# Patient Record
Sex: FEMALE | Race: WHITE | NOT HISPANIC OR LATINO
[De-identification: names, ages, dates, MRNs, and addresses within clinical notes are randomized per-mention and may not be internally consistent; named-entity substitution may affect disease eponyms.]

---

## 2022-08-22 PROBLEM — Z00.00 ENCOUNTER FOR PREVENTIVE HEALTH EXAMINATION: Status: ACTIVE | Noted: 2022-08-22

## 2022-08-23 ENCOUNTER — APPOINTMENT (OUTPATIENT)
Dept: OBGYN | Facility: CLINIC | Age: 22
End: 2022-08-23

## 2022-08-23 ENCOUNTER — NON-APPOINTMENT (OUTPATIENT)
Age: 22
End: 2022-08-23

## 2022-08-23 PROCEDURE — 99204 OFFICE O/P NEW MOD 45 MIN: CPT

## 2022-08-23 PROCEDURE — 76805 OB US >/= 14 WKS SNGL FETUS: CPT

## 2022-09-02 ENCOUNTER — NON-APPOINTMENT (OUTPATIENT)
Age: 22
End: 2022-09-02

## 2022-09-02 DIAGNOSIS — O35.8XX0 MATERNAL CARE FOR OTHER (SUSPECTED) FETAL ABNORMALITY AND DAMAGE, NOT APPLICABLE OR UNSPECIFIED: ICD-10-CM

## 2022-09-22 ENCOUNTER — APPOINTMENT (OUTPATIENT)
Dept: OBGYN | Facility: CLINIC | Age: 22
End: 2022-09-22

## 2022-09-22 VITALS
BODY MASS INDEX: 23.04 KG/M2 | DIASTOLIC BLOOD PRESSURE: 70 MMHG | SYSTOLIC BLOOD PRESSURE: 115 MMHG | HEIGHT: 63 IN | WEIGHT: 130 LBS

## 2022-09-22 PROCEDURE — 0502F SUBSEQUENT PRENATAL CARE: CPT

## 2022-10-06 DIAGNOSIS — Z34.90 ENCOUNTER FOR SUPERVISION OF NORMAL PREGNANCY, UNSPECIFIED, UNSPECIFIED TRIMESTER: ICD-10-CM

## 2022-10-13 LAB
BASOPHILS # BLD AUTO: 0.05 K/UL
BASOPHILS NFR BLD AUTO: 0.4 %
EOSINOPHIL # BLD AUTO: 0.14 K/UL
EOSINOPHIL NFR BLD AUTO: 1.1 %
GLUCOSE 1H P 50 G GLC PO SERPL-MCNC: 119 MG/DL
HCT VFR BLD CALC: 32 %
HGB BLD-MCNC: 10.5 G/DL
IMM GRANULOCYTES NFR BLD AUTO: 0.8 %
LYMPHOCYTES # BLD AUTO: 1.59 K/UL
LYMPHOCYTES NFR BLD AUTO: 12.1 %
MAN DIFF?: NORMAL
MCHC RBC-ENTMCNC: 31.4 PG
MCHC RBC-ENTMCNC: 32.8 G/DL
MCV RBC AUTO: 95.8 FL
MONOCYTES # BLD AUTO: 1.03 K/UL
MONOCYTES NFR BLD AUTO: 7.8 %
NEUTROPHILS # BLD AUTO: 10.28 K/UL
NEUTROPHILS NFR BLD AUTO: 77.8 %
PLATELET # BLD AUTO: 232 K/UL
RBC # BLD: 3.34 M/UL
RBC # FLD: 13.4 %
WBC # FLD AUTO: 13.19 K/UL

## 2022-10-20 ENCOUNTER — APPOINTMENT (OUTPATIENT)
Dept: OBGYN | Facility: CLINIC | Age: 22
End: 2022-10-20

## 2022-10-20 VITALS
SYSTOLIC BLOOD PRESSURE: 115 MMHG | DIASTOLIC BLOOD PRESSURE: 70 MMHG | HEIGHT: 63 IN | BODY MASS INDEX: 24.1 KG/M2 | WEIGHT: 136 LBS

## 2022-10-20 PROCEDURE — 0502F SUBSEQUENT PRENATAL CARE: CPT

## 2022-10-24 LAB
BILIRUB UR QL STRIP: NORMAL
CLARITY UR: CLEAR
COLLECTION METHOD: NORMAL
GLUCOSE UR-MCNC: NORMAL
HCG UR QL: 0.2 EU/DL
HGB UR QL STRIP.AUTO: NORMAL
KETONES UR-MCNC: NORMAL
LEUKOCYTE ESTERASE UR QL STRIP: NORMAL
NITRITE UR QL STRIP: NORMAL
PH UR STRIP: 7
PROT UR STRIP-MCNC: NORMAL
SP GR UR STRIP: 1.02

## 2022-11-08 ENCOUNTER — APPOINTMENT (OUTPATIENT)
Dept: OBGYN | Facility: CLINIC | Age: 22
End: 2022-11-08

## 2022-11-08 PROCEDURE — 76819 FETAL BIOPHYS PROFIL W/O NST: CPT

## 2022-11-08 PROCEDURE — 0502F SUBSEQUENT PRENATAL CARE: CPT

## 2022-11-08 PROCEDURE — 76815 OB US LIMITED FETUS(S): CPT

## 2022-11-10 ENCOUNTER — OUTPATIENT (OUTPATIENT)
Dept: OUTPATIENT SERVICES | Facility: HOSPITAL | Age: 22
LOS: 1 days | Discharge: HOME | End: 2022-11-10

## 2022-11-10 ENCOUNTER — APPOINTMENT (OUTPATIENT)
Dept: ANTEPARTUM | Facility: CLINIC | Age: 22
End: 2022-11-10

## 2022-11-10 ENCOUNTER — ASOB RESULT (OUTPATIENT)
Age: 22
End: 2022-11-10

## 2022-11-10 VITALS — DIASTOLIC BLOOD PRESSURE: 61 MMHG | SYSTOLIC BLOOD PRESSURE: 109 MMHG

## 2022-11-10 PROCEDURE — 76820 UMBILICAL ARTERY ECHO: CPT | Mod: 26,59

## 2022-11-10 PROCEDURE — 76818 FETAL BIOPHYS PROFILE W/NST: CPT | Mod: 26,59

## 2022-11-10 PROCEDURE — 76816 OB US FOLLOW-UP PER FETUS: CPT | Mod: 26

## 2022-11-10 PROCEDURE — 99202 OFFICE O/P NEW SF 15 MIN: CPT | Mod: 25

## 2022-11-15 ENCOUNTER — APPOINTMENT (OUTPATIENT)
Dept: ANTEPARTUM | Facility: CLINIC | Age: 22
End: 2022-11-15
Payer: COMMERCIAL

## 2022-11-15 ENCOUNTER — ASOB RESULT (OUTPATIENT)
Age: 22
End: 2022-11-15

## 2022-11-15 VITALS
HEIGHT: 63 IN | WEIGHT: 143 LBS | HEART RATE: 98 BPM | BODY MASS INDEX: 25.34 KG/M2 | DIASTOLIC BLOOD PRESSURE: 72 MMHG | SYSTOLIC BLOOD PRESSURE: 116 MMHG

## 2022-11-15 PROCEDURE — ZZZZZ: CPT

## 2022-11-15 PROCEDURE — 76818 FETAL BIOPHYS PROFILE W/NST: CPT

## 2022-11-15 PROCEDURE — 76820 UMBILICAL ARTERY ECHO: CPT | Mod: 59

## 2022-11-17 ENCOUNTER — APPOINTMENT (OUTPATIENT)
Dept: ANTEPARTUM | Facility: CLINIC | Age: 22
End: 2022-11-17
Payer: COMMERCIAL

## 2022-11-17 ENCOUNTER — ASOB RESULT (OUTPATIENT)
Age: 22
End: 2022-11-17

## 2022-11-17 VITALS
DIASTOLIC BLOOD PRESSURE: 80 MMHG | BODY MASS INDEX: 25.52 KG/M2 | SYSTOLIC BLOOD PRESSURE: 121 MMHG | WEIGHT: 144 LBS | HEART RATE: 78 BPM | HEIGHT: 63 IN

## 2022-11-17 DIAGNOSIS — O99.019 ANEMIA COMPLICATING PREGNANCY, UNSPECIFIED TRIMESTER: ICD-10-CM

## 2022-11-17 PROCEDURE — ZZZZZ: CPT

## 2022-11-17 PROCEDURE — 76818 FETAL BIOPHYS PROFILE W/NST: CPT

## 2022-11-17 PROCEDURE — 99213 OFFICE O/P EST LOW 20 MIN: CPT | Mod: 25

## 2022-11-17 PROCEDURE — 76820 UMBILICAL ARTERY ECHO: CPT

## 2022-11-17 NOTE — DISCUSSION/SUMMARY
[FreeTextEntry1] : 22 year old  1 para 0 LMP 3/22/22 MANNY 22 34 weeks 4 days followed for IUGR.\par Biophysical profile is 10/10. Umbilical artery Doppler studies normal.\par The parents had questions about prognosis for the  and the timing of delivery.\par There questions were answered.

## 2022-11-21 ENCOUNTER — APPOINTMENT (OUTPATIENT)
Dept: ANTEPARTUM | Facility: CLINIC | Age: 22
End: 2022-11-21

## 2022-11-21 ENCOUNTER — ASOB RESULT (OUTPATIENT)
Age: 22
End: 2022-11-21

## 2022-11-21 PROCEDURE — ZZZZZ: CPT

## 2022-11-21 PROCEDURE — 76820 UMBILICAL ARTERY ECHO: CPT

## 2022-11-21 PROCEDURE — 76818 FETAL BIOPHYS PROFILE W/NST: CPT

## 2022-11-23 ENCOUNTER — APPOINTMENT (OUTPATIENT)
Dept: ANTEPARTUM | Facility: CLINIC | Age: 22
End: 2022-11-23

## 2022-11-23 ENCOUNTER — ASOB RESULT (OUTPATIENT)
Age: 22
End: 2022-11-23

## 2022-11-23 VITALS
BODY MASS INDEX: 25.52 KG/M2 | DIASTOLIC BLOOD PRESSURE: 74 MMHG | SYSTOLIC BLOOD PRESSURE: 116 MMHG | WEIGHT: 144 LBS | HEIGHT: 63 IN | HEART RATE: 83 BPM

## 2022-11-23 PROCEDURE — 76820 UMBILICAL ARTERY ECHO: CPT

## 2022-11-23 PROCEDURE — 99214 OFFICE O/P EST MOD 30 MIN: CPT | Mod: 25

## 2022-11-23 PROCEDURE — ZZZZZ: CPT

## 2022-11-23 PROCEDURE — 76818 FETAL BIOPHYS PROFILE W/NST: CPT

## 2022-11-23 NOTE — DISCUSSION/SUMMARY
[FreeTextEntry1] : 22 year old  1 para 0 LMP 3/22/22 MANNY 22 35 weeks 1 day followed for IUGR.\par Biophysical profile is 10/10. Umbilical artery Doppler studies normal.\par Ms. Gaffney had questions that were answered and concerns that were addressed.\par She was sent home with instructions.\par

## 2022-11-28 ENCOUNTER — APPOINTMENT (OUTPATIENT)
Dept: ANTEPARTUM | Facility: CLINIC | Age: 22
End: 2022-11-28

## 2022-11-28 ENCOUNTER — ASOB RESULT (OUTPATIENT)
Age: 22
End: 2022-11-28

## 2022-11-28 ENCOUNTER — APPOINTMENT (OUTPATIENT)
Dept: OBGYN | Facility: CLINIC | Age: 22
End: 2022-11-28

## 2022-11-28 VITALS
HEART RATE: 74 BPM | WEIGHT: 146 LBS | SYSTOLIC BLOOD PRESSURE: 118 MMHG | BODY MASS INDEX: 25.87 KG/M2 | HEIGHT: 63 IN | DIASTOLIC BLOOD PRESSURE: 78 MMHG

## 2022-11-28 PROCEDURE — ZZZZZ: CPT

## 2022-11-28 PROCEDURE — 76816 OB US FOLLOW-UP PER FETUS: CPT

## 2022-11-28 PROCEDURE — 76820 UMBILICAL ARTERY ECHO: CPT | Mod: 59

## 2022-11-28 PROCEDURE — 99214 OFFICE O/P EST MOD 30 MIN: CPT | Mod: 25

## 2022-11-28 PROCEDURE — 0502F SUBSEQUENT PRENATAL CARE: CPT

## 2022-11-28 PROCEDURE — 76818 FETAL BIOPHYS PROFILE W/NST: CPT | Mod: 59

## 2022-12-01 ENCOUNTER — ASOB RESULT (OUTPATIENT)
Age: 22
End: 2022-12-01

## 2022-12-01 ENCOUNTER — APPOINTMENT (OUTPATIENT)
Dept: ANTEPARTUM | Facility: CLINIC | Age: 22
End: 2022-12-01

## 2022-12-01 VITALS — HEIGHT: 63 IN | HEART RATE: 87 BPM | SYSTOLIC BLOOD PRESSURE: 110 MMHG | DIASTOLIC BLOOD PRESSURE: 78 MMHG

## 2022-12-01 PROCEDURE — 76818 FETAL BIOPHYS PROFILE W/NST: CPT

## 2022-12-01 PROCEDURE — 76820 UMBILICAL ARTERY ECHO: CPT

## 2022-12-01 PROCEDURE — ZZZZZ: CPT

## 2022-12-05 ENCOUNTER — APPOINTMENT (OUTPATIENT)
Dept: OBGYN | Facility: CLINIC | Age: 22
End: 2022-12-05

## 2022-12-05 ENCOUNTER — APPOINTMENT (OUTPATIENT)
Dept: ANTEPARTUM | Facility: CLINIC | Age: 22
End: 2022-12-05

## 2022-12-05 ENCOUNTER — ASOB RESULT (OUTPATIENT)
Age: 22
End: 2022-12-05

## 2022-12-05 VITALS
HEART RATE: 94 BPM | WEIGHT: 147 LBS | DIASTOLIC BLOOD PRESSURE: 68 MMHG | HEIGHT: 63 IN | BODY MASS INDEX: 26.05 KG/M2 | SYSTOLIC BLOOD PRESSURE: 108 MMHG

## 2022-12-05 VITALS
BODY MASS INDEX: 25.87 KG/M2 | SYSTOLIC BLOOD PRESSURE: 120 MMHG | WEIGHT: 146 LBS | HEIGHT: 63 IN | DIASTOLIC BLOOD PRESSURE: 70 MMHG

## 2022-12-05 PROCEDURE — ZZZZZ: CPT

## 2022-12-05 PROCEDURE — 99213 OFFICE O/P EST LOW 20 MIN: CPT | Mod: 25

## 2022-12-05 PROCEDURE — 76820 UMBILICAL ARTERY ECHO: CPT

## 2022-12-05 PROCEDURE — 76818 FETAL BIOPHYS PROFILE W/NST: CPT

## 2022-12-05 PROCEDURE — 0502F SUBSEQUENT PRENATAL CARE: CPT

## 2022-12-05 NOTE — DISCUSSION/SUMMARY
[FreeTextEntry1] : 22\par MFM Att'g f/u Consult Note: \par Ms Gaffney is a patient of Dr Eh Christensen who has been referred for management of fetal growth restriction and returns today with her  (PGY-1 radiology resident).  \par She is a [id number] at 37w6d (MANNY 22 by LMP c/w early outside [place]).  Please see our initial US consultation from 11/10. She first presented at 33w with EFW at 1st %ile by LMP.  The anatomic survey was done at [facility], and the fetal heart was not completely imaged. \par \par Last week's f/u US shows normal interval growth with EFW 2215g at 5th%ile with HC at 3rd%ile and AC at the 10th%ile. These measurements suggest some improvement from improved from  measurements with EFW at 1st%ile and AC at 3rd%ile. \par \par Fetal testing is very reassuring today:\par BPP 10/10.  NST baseline 145 with moderate variability, accels to 160, no decels.  TOCO no contractions. REACTIVE.\par Umbilical Artery Dopplers continue improved: s/d =  2.4, improved from two weeks ago of 3.2. \par \par We continued our discussion of delivery options.  I explained that with today's findings, elective delivery between 37 and 38w0d is very reasonable and depends on multiple factors including:\par - Discussion with primary Obstetrician, Dr Christensen.\par - Continued reassuring findings on fetal testing. \par - Their own comfort level proceeding past early term intervention.  \par \par For now they plan to return for twice weekly testing later this week.  \par Continue twice daily fetal movement counts. \par Come to L&D for VB, LOF, decr fetal movements or abdominal pain/contractions.  They understand that false alarms are fine.\par \par MD Joel, FACOG\par

## 2022-12-06 ENCOUNTER — NON-APPOINTMENT (OUTPATIENT)
Age: 22
End: 2022-12-06

## 2022-12-08 ENCOUNTER — APPOINTMENT (OUTPATIENT)
Dept: ANTEPARTUM | Facility: CLINIC | Age: 22
End: 2022-12-08
Payer: COMMERCIAL

## 2022-12-08 ENCOUNTER — ASOB RESULT (OUTPATIENT)
Age: 22
End: 2022-12-08

## 2022-12-08 VITALS
BODY MASS INDEX: 25.87 KG/M2 | WEIGHT: 146 LBS | HEIGHT: 63 IN | SYSTOLIC BLOOD PRESSURE: 120 MMHG | DIASTOLIC BLOOD PRESSURE: 78 MMHG | HEART RATE: 110 BPM

## 2022-12-08 DIAGNOSIS — O36.5990 MATERNAL CARE FOR OTHER KNOWN OR SUSPECTED POOR FETAL GROWTH, UNSPECIFIED TRIMESTER, NOT APPLICABLE OR UNSPECIFIED: ICD-10-CM

## 2022-12-08 PROCEDURE — 76818 FETAL BIOPHYS PROFILE W/NST: CPT

## 2022-12-08 PROCEDURE — ZZZZZ: CPT

## 2022-12-08 PROCEDURE — 76820 UMBILICAL ARTERY ECHO: CPT

## 2022-12-08 PROCEDURE — 99211 OFF/OP EST MAY X REQ PHY/QHP: CPT | Mod: 25

## 2022-12-12 ENCOUNTER — APPOINTMENT (OUTPATIENT)
Dept: ANTEPARTUM | Facility: CLINIC | Age: 22
End: 2022-12-12

## 2022-12-12 ENCOUNTER — ASOB RESULT (OUTPATIENT)
Age: 22
End: 2022-12-12

## 2022-12-12 ENCOUNTER — LABORATORY RESULT (OUTPATIENT)
Age: 22
End: 2022-12-12

## 2022-12-12 ENCOUNTER — APPOINTMENT (OUTPATIENT)
Dept: OBGYN | Facility: CLINIC | Age: 22
End: 2022-12-12

## 2022-12-12 VITALS
SYSTOLIC BLOOD PRESSURE: 120 MMHG | DIASTOLIC BLOOD PRESSURE: 80 MMHG | BODY MASS INDEX: 26.05 KG/M2 | WEIGHT: 147 LBS | HEIGHT: 63 IN

## 2022-12-12 VITALS
SYSTOLIC BLOOD PRESSURE: 116 MMHG | DIASTOLIC BLOOD PRESSURE: 78 MMHG | HEART RATE: 79 BPM | BODY MASS INDEX: 26.05 KG/M2 | WEIGHT: 147 LBS | HEIGHT: 63 IN

## 2022-12-12 PROCEDURE — 76816 OB US FOLLOW-UP PER FETUS: CPT

## 2022-12-12 PROCEDURE — 76820 UMBILICAL ARTERY ECHO: CPT | Mod: 59

## 2022-12-12 PROCEDURE — ZZZZZ: CPT

## 2022-12-12 PROCEDURE — 76818 FETAL BIOPHYS PROFILE W/NST: CPT | Mod: 59

## 2022-12-12 PROCEDURE — 0502F SUBSEQUENT PRENATAL CARE: CPT

## 2022-12-13 LAB
BILIRUB UR QL STRIP: NORMAL
CLARITY UR: CLEAR
COLLECTION METHOD: NORMAL
GLUCOSE UR-MCNC: NORMAL
HCG UR QL: 0.2 EU/DL
HGB UR QL STRIP.AUTO: NORMAL
KETONES UR-MCNC: NORMAL
LEUKOCYTE ESTERASE UR QL STRIP: NORMAL
NITRITE UR QL STRIP: NORMAL
PH UR STRIP: 6
PROT UR STRIP-MCNC: NORMAL
SP GR UR STRIP: 1.02

## 2022-12-14 ENCOUNTER — INPATIENT (INPATIENT)
Facility: HOSPITAL | Age: 22
LOS: 2 days | Discharge: HOME | End: 2022-12-17
Attending: OBSTETRICS & GYNECOLOGY | Admitting: OBSTETRICS & GYNECOLOGY

## 2022-12-14 LAB
ABO RH CONFIRMATION: SIGNIFICANT CHANGE UP
APPEARANCE UR: CLEAR — SIGNIFICANT CHANGE UP
BACTERIA # UR AUTO: ABNORMAL
BASOPHILS # BLD AUTO: 0.04 K/UL — SIGNIFICANT CHANGE UP (ref 0–0.2)
BASOPHILS NFR BLD AUTO: 0.3 % — SIGNIFICANT CHANGE UP (ref 0–1)
BILIRUB UR-MCNC: NEGATIVE — SIGNIFICANT CHANGE UP
BLD GP AB SCN SERPL QL: SIGNIFICANT CHANGE UP
COLOR SPEC: YELLOW — SIGNIFICANT CHANGE UP
DIFF PNL FLD: NEGATIVE — SIGNIFICANT CHANGE UP
EOSINOPHIL # BLD AUTO: 0.19 K/UL — SIGNIFICANT CHANGE UP (ref 0–0.7)
EOSINOPHIL NFR BLD AUTO: 1.4 % — SIGNIFICANT CHANGE UP (ref 0–8)
EPI CELLS # UR: 13 /HPF — HIGH (ref 0–5)
GLUCOSE UR QL: NEGATIVE — SIGNIFICANT CHANGE UP
HCT VFR BLD CALC: 32.5 % — LOW (ref 37–47)
HGB BLD-MCNC: 11.7 G/DL — LOW (ref 12–16)
HIV 1 & 2 AB SERPL IA.RAPID: SIGNIFICANT CHANGE UP
HYALINE CASTS # UR AUTO: 7 /LPF — SIGNIFICANT CHANGE UP (ref 0–7)
IMM GRANULOCYTES NFR BLD AUTO: 0.4 % — HIGH (ref 0.1–0.3)
KETONES UR-MCNC: SIGNIFICANT CHANGE UP
LEUKOCYTE ESTERASE UR-ACNC: NEGATIVE — SIGNIFICANT CHANGE UP
LYMPHOCYTES # BLD AUTO: 1.79 K/UL — SIGNIFICANT CHANGE UP (ref 1.2–3.4)
LYMPHOCYTES # BLD AUTO: 13.6 % — LOW (ref 20.5–51.1)
MCHC RBC-ENTMCNC: 32.5 PG — HIGH (ref 27–31)
MCHC RBC-ENTMCNC: 36 G/DL — SIGNIFICANT CHANGE UP (ref 32–37)
MCV RBC AUTO: 90.3 FL — SIGNIFICANT CHANGE UP (ref 81–99)
MONOCYTES # BLD AUTO: 0.99 K/UL — HIGH (ref 0.1–0.6)
MONOCYTES NFR BLD AUTO: 7.5 % — SIGNIFICANT CHANGE UP (ref 1.7–9.3)
NEUTROPHILS # BLD AUTO: 10.1 K/UL — HIGH (ref 1.4–6.5)
NEUTROPHILS NFR BLD AUTO: 76.8 % — HIGH (ref 42.2–75.2)
NITRITE UR-MCNC: NEGATIVE — SIGNIFICANT CHANGE UP
NRBC # BLD: 0 /100 WBCS — SIGNIFICANT CHANGE UP (ref 0–0)
PH UR: 6.5 — SIGNIFICANT CHANGE UP (ref 5–8)
PLATELET # BLD AUTO: 221 K/UL — SIGNIFICANT CHANGE UP (ref 130–400)
PRENATAL SYPHILIS TEST: SIGNIFICANT CHANGE UP
PROT UR-MCNC: ABNORMAL
RBC # BLD: 3.6 M/UL — LOW (ref 4.2–5.4)
RBC # FLD: 13.2 % — SIGNIFICANT CHANGE UP (ref 11.5–14.5)
RBC CASTS # UR COMP ASSIST: 0 /HPF — SIGNIFICANT CHANGE UP (ref 0–4)
SP GR SPEC: 1.03 — SIGNIFICANT CHANGE UP (ref 1.01–1.03)
UROBILINOGEN FLD QL: SIGNIFICANT CHANGE UP
WBC # BLD: 13.16 K/UL — HIGH (ref 4.8–10.8)
WBC # FLD AUTO: 13.16 K/UL — HIGH (ref 4.8–10.8)
WBC UR QL: 5 /HPF — SIGNIFICANT CHANGE UP (ref 0–5)

## 2022-12-14 RX ORDER — AMPICILLIN TRIHYDRATE 250 MG
1 CAPSULE ORAL EVERY 4 HOURS
Refills: 0 | Status: DISCONTINUED | OUTPATIENT
Start: 2022-12-14 | End: 2022-12-15

## 2022-12-14 RX ORDER — OXYTOCIN 10 UNIT/ML
333.33 VIAL (ML) INJECTION
Qty: 20 | Refills: 0 | Status: DISCONTINUED | OUTPATIENT
Start: 2022-12-14 | End: 2022-12-15

## 2022-12-14 RX ORDER — AMPICILLIN TRIHYDRATE 250 MG
2 CAPSULE ORAL ONCE
Refills: 0 | Status: COMPLETED | OUTPATIENT
Start: 2022-12-14 | End: 2022-12-14

## 2022-12-14 RX ORDER — SODIUM CHLORIDE 9 MG/ML
1000 INJECTION, SOLUTION INTRAVENOUS
Refills: 0 | Status: DISCONTINUED | OUTPATIENT
Start: 2022-12-14 | End: 2022-12-15

## 2022-12-14 RX ORDER — INFLUENZA VIRUS VACCINE 15; 15; 15; 15 UG/.5ML; UG/.5ML; UG/.5ML; UG/.5ML
0.5 SUSPENSION INTRAMUSCULAR ONCE
Refills: 0 | Status: DISCONTINUED | OUTPATIENT
Start: 2022-12-14 | End: 2022-12-15

## 2022-12-14 RX ORDER — CITRIC ACID/SODIUM CITRATE 300-500 MG
15 SOLUTION, ORAL ORAL EVERY 6 HOURS
Refills: 0 | Status: DISCONTINUED | OUTPATIENT
Start: 2022-12-14 | End: 2022-12-15

## 2022-12-14 RX ORDER — DINOPROSTONE 10 MG/241MG
10 INSERT VAGINAL ONCE
Refills: 0 | Status: COMPLETED | OUTPATIENT
Start: 2022-12-14 | End: 2022-12-14

## 2022-12-14 RX ADMIN — DINOPROSTONE 10 MILLIGRAM(S): 10 INSERT VAGINAL at 21:30

## 2022-12-14 RX ADMIN — Medication 200 GRAM(S): at 21:30

## 2022-12-14 RX ADMIN — SODIUM CHLORIDE 125 MILLILITER(S): 9 INJECTION, SOLUTION INTRAVENOUS at 21:31

## 2022-12-14 NOTE — OB RN PATIENT PROFILE - FALL HARM RISK - UNIVERSAL INTERVENTIONS
Bed in lowest position, wheels locked, appropriate side rails in place/Call bell, personal items and telephone in reach/Instruct patient to call for assistance before getting out of bed or chair/Non-slip footwear when patient is out of bed/[place] to call system/Physically safe environment - no spills, clutter or unnecessary equipment/Purposeful Proactive Rounding/Room/bathroom lighting operational, light cord in reach

## 2022-12-14 NOTE — OB PROVIDER H&P - NSHPPHYSICALEXAM_GEN_ALL_CORE
Vital Signs Last 24 Hrs  T(C): 37 (14 Dec 2022 20:27), Max: 37 (14 Dec 2022 20:27)  T(F): 98.6 (14 Dec 2022 20:27), Max: 98.6 (14 Dec 2022 20:27)  HR: 83 (14 Dec 2022 20:27) (83 - 83)  BP: 122/74 (14 Dec 2022 20:27) (122/74 - 122/74)  RR: 18 (14 Dec 2022 20:27) (18 - 18)    Gen: NAD, sitting comfortably  Abd: Gravid, soft, NT, no palpable ctx  SVE: 1/0/-3, vtx, intact  EFM: 140/mod/+accels  [place]: none  Sono: cephalic

## 2022-12-14 NOTE — OB PROVIDER H&P - HISTORY OF PRESENT ILLNESS
23 yo  @38w2d, MANNY  by LMP, presents for scheduled IOL for IUGR. Pt reports good FM, denies LOF, VB or CTX. Pregnancy complicated by FGR down to the 1%il. On most recent growth sonogram @37w6d, EFW 2%il with AC <1%. Umbilical artery dopplers always within normal limits. Pt was late transfer of care at 22 weeks from [place] due to pt moving. GBS positive.

## 2022-12-14 NOTE — OB PROVIDER H&P - NSHPLABSRESULTS_GEN_ALL_CORE
Labs  GBS positive 11/28      5/17/22  HBsAG NR   Hep C NR  Quant gold negative  RPR NR  Varicella non immune  Rubella immune  measles immune  HIV NR  A Pos, ab neg    Sono  10w6d, NT 1.2mm  19w6d, anterior placenta, vtx, MPV 4.0, EFW 292g, normal anatomy  33w2d, EFW 1611g (1%), MVP 5.36  35w6d, EFW 2215g (5%), UA doppler wnl, MVP 6.7  36w3d, MVP 4.36, anterior placenta, UA doppler wnl  37w6d, EFW 2378g (2%), AC <1%, BPD 6%, UA doppler wnl

## 2022-12-14 NOTE — OB PROVIDER H&P - NSLOWPPHRISK_OBGYN_A_OB
No previous uterine incision/[place] Pregnancy/Less than or equal to 4 previous vaginal births/No known bleeding disorder/No history of postpartum hemorrhage/No other PPH risks indicated

## 2022-12-14 NOTE — OB PROVIDER H&P - ASSESSMENT
A/P: 23 yo  @38w2d, GBS positive, FGR 2%, AC <1%, UA dopplers wnl, here for scheduled IOL  -Admit to L+D  -Monitor EFM and TOCO   -IVF and labs  -Pain control PRN  -Clear liquid diet as tolerated  -COVID negative OP  -UDS  -IOL with cervidil    Dr. Daugherty and Dr. Christensen aware

## 2022-12-15 ENCOUNTER — RESULT REVIEW (OUTPATIENT)
Age: 22
End: 2022-12-15

## 2022-12-15 ENCOUNTER — TRANSCRIPTION ENCOUNTER (OUTPATIENT)
Age: 22
End: 2022-12-15

## 2022-12-15 LAB
AMPHET UR-MCNC: NEGATIVE — SIGNIFICANT CHANGE UP
BARBITURATES UR SCN-MCNC: NEGATIVE — SIGNIFICANT CHANGE UP
BENZODIAZ UR-MCNC: NEGATIVE — SIGNIFICANT CHANGE UP
BUPRENORPHINE SCREEN, URINE RESULT: NEGATIVE — SIGNIFICANT CHANGE UP
COCAINE METAB.OTHER UR-MCNC: NEGATIVE — SIGNIFICANT CHANGE UP
FENTANYL UR QL: NEGATIVE — SIGNIFICANT CHANGE UP
L&D DRUG SCREEN, URINE: SIGNIFICANT CHANGE UP
METHADONE UR-MCNC: NEGATIVE — SIGNIFICANT CHANGE UP
OPIATES UR-MCNC: NEGATIVE — SIGNIFICANT CHANGE UP
OXYCODONE UR-MCNC: NEGATIVE — SIGNIFICANT CHANGE UP
PCP UR-MCNC: NEGATIVE — SIGNIFICANT CHANGE UP
PROPOXYPHENE QUALITATIVE URINE RESULT: NEGATIVE — SIGNIFICANT CHANGE UP

## 2022-12-15 PROCEDURE — 59400 OBSTETRICAL CARE: CPT

## 2022-12-15 RX ORDER — LANOLIN
1 OINTMENT (GRAM) TOPICAL EVERY 6 HOURS
Refills: 0 | Status: DISCONTINUED | OUTPATIENT
Start: 2022-12-15 | End: 2022-12-17

## 2022-12-15 RX ORDER — TETANUS TOXOID, REDUCED DIPHTHERIA TOXOID AND ACELLULAR PERTUSSIS VACCINE, ADSORBED 5; 2.5; 8; 8; 2.5 [IU]/.5ML; [IU]/.5ML; UG/.5ML; UG/.5ML; UG/.5ML
0.5 SUSPENSION INTRAMUSCULAR ONCE
Refills: 0 | Status: DISCONTINUED | OUTPATIENT
Start: 2022-12-15 | End: 2022-12-17

## 2022-12-15 RX ORDER — BENZOCAINE 10 %
1 GEL (GRAM) MUCOUS MEMBRANE EVERY 6 HOURS
Refills: 0 | Status: DISCONTINUED | OUTPATIENT
Start: 2022-12-15 | End: 2022-12-17

## 2022-12-15 RX ORDER — ONDANSETRON 8 MG/1
4 TABLET, FILM COATED ORAL EVERY 6 HOURS
Refills: 0 | Status: DISCONTINUED | OUTPATIENT
Start: 2022-12-15 | End: 2022-12-15

## 2022-12-15 RX ORDER — NALOXONE HYDROCHLORIDE 4 MG/.1ML
0.1 SPRAY NASAL
Refills: 0 | Status: DISCONTINUED | OUTPATIENT
Start: 2022-12-15 | End: 2022-12-15

## 2022-12-15 RX ORDER — OXYTOCIN 10 UNIT/ML
41.67 VIAL (ML) INJECTION
Qty: 20 | Refills: 0 | Status: DISCONTINUED | OUTPATIENT
Start: 2022-12-15 | End: 2022-12-17

## 2022-12-15 RX ORDER — SODIUM CHLORIDE 9 MG/ML
3 INJECTION INTRAMUSCULAR; INTRAVENOUS; SUBCUTANEOUS EVERY 8 HOURS
Refills: 0 | Status: DISCONTINUED | OUTPATIENT
Start: 2022-12-15 | End: 2022-12-17

## 2022-12-15 RX ORDER — OXYTOCIN 10 UNIT/ML
2 VIAL (ML) INJECTION
Qty: 30 | Refills: 0 | Status: DISCONTINUED | OUTPATIENT
Start: 2022-12-15 | End: 2022-12-15

## 2022-12-15 RX ORDER — OXYCODONE HYDROCHLORIDE 5 MG/1
5 TABLET ORAL ONCE
Refills: 0 | Status: DISCONTINUED | OUTPATIENT
Start: 2022-12-15 | End: 2022-12-17

## 2022-12-15 RX ORDER — DIBUCAINE 1 %
1 OINTMENT (GRAM) RECTAL EVERY 6 HOURS
Refills: 0 | Status: DISCONTINUED | OUTPATIENT
Start: 2022-12-15 | End: 2022-12-17

## 2022-12-15 RX ORDER — AER TRAVELER 0.5 G/1
1 SOLUTION RECTAL; TOPICAL EVERY 4 HOURS
Refills: 0 | Status: DISCONTINUED | OUTPATIENT
Start: 2022-12-15 | End: 2022-12-17

## 2022-12-15 RX ORDER — HYDROCORTISONE 1 %
1 OINTMENT (GRAM) TOPICAL EVERY 6 HOURS
Refills: 0 | Status: DISCONTINUED | OUTPATIENT
Start: 2022-12-15 | End: 2022-12-17

## 2022-12-15 RX ORDER — KETOROLAC TROMETHAMINE 30 MG/ML
30 SYRINGE (ML) INJECTION ONCE
Refills: 0 | Status: DISCONTINUED | OUTPATIENT
Start: 2022-12-15 | End: 2022-12-17

## 2022-12-15 RX ORDER — DIPHENHYDRAMINE HCL 50 MG
25 CAPSULE ORAL EVERY 6 HOURS
Refills: 0 | Status: DISCONTINUED | OUTPATIENT
Start: 2022-12-15 | End: 2022-12-17

## 2022-12-15 RX ORDER — IBUPROFEN 200 MG
600 TABLET ORAL EVERY 6 HOURS
Refills: 0 | Status: COMPLETED | OUTPATIENT
Start: 2022-12-15 | End: 2023-11-13

## 2022-12-15 RX ORDER — OXYCODONE HYDROCHLORIDE 5 MG/1
5 TABLET ORAL
Refills: 0 | Status: DISCONTINUED | OUTPATIENT
Start: 2022-12-15 | End: 2022-12-17

## 2022-12-15 RX ORDER — FENTANYL/BUPIVACAINE/NS/PF 2MCG/ML-.1
250 PLASTIC BAG, INJECTION (ML) INJECTION
Refills: 0 | Status: DISCONTINUED | OUTPATIENT
Start: 2022-12-15 | End: 2022-12-15

## 2022-12-15 RX ORDER — SIMETHICONE 80 MG/1
80 TABLET, CHEWABLE ORAL EVERY 4 HOURS
Refills: 0 | Status: DISCONTINUED | OUTPATIENT
Start: 2022-12-15 | End: 2022-12-17

## 2022-12-15 RX ORDER — ACETAMINOPHEN 500 MG
975 TABLET ORAL
Refills: 0 | Status: DISCONTINUED | OUTPATIENT
Start: 2022-12-15 | End: 2022-12-17

## 2022-12-15 RX ORDER — MAGNESIUM HYDROXIDE 400 MG/1
30 TABLET, CHEWABLE ORAL
Refills: 0 | Status: DISCONTINUED | OUTPATIENT
Start: 2022-12-15 | End: 2022-12-17

## 2022-12-15 RX ORDER — DEXAMETHASONE 0.5 MG/5ML
4 ELIXIR ORAL EVERY 6 HOURS
Refills: 0 | Status: DISCONTINUED | OUTPATIENT
Start: 2022-12-15 | End: 2022-12-15

## 2022-12-15 RX ADMIN — Medication 108 GRAM(S): at 09:30

## 2022-12-15 RX ADMIN — Medication 125 MILLIUNIT(S)/MIN: at 22:29

## 2022-12-15 RX ADMIN — Medication 108 GRAM(S): at 01:34

## 2022-12-15 RX ADMIN — SODIUM CHLORIDE 125 MILLILITER(S): 9 INJECTION, SOLUTION INTRAVENOUS at 13:26

## 2022-12-15 RX ADMIN — Medication 108 GRAM(S): at 05:33

## 2022-12-15 RX ADMIN — Medication 108 GRAM(S): at 13:30

## 2022-12-15 RX ADMIN — Medication 108 GRAM(S): at 17:43

## 2022-12-15 RX ADMIN — Medication 2 MILLIUNIT(S)/MIN: at 13:27

## 2022-12-15 NOTE — OB PROVIDER DELIVERY SUMMARY - NSPROVIDERDELIVERYNOTE_OBGYN_ALL_OB_FT
boy  median episiotomy repaired with 2-0 chromic  ebl 400cc  apgars 9,9  complications none Patient was fully dilated and pushed. Decelerations noted in fetal heart rate so decision made to perform midline episiotomy to facilitate delivery. Fetal head delivered OA, and restituted to ROT. Compound presentation of right hand noted next to the shoulder. The anterior and  posterior shoulders delivered, followed by the remaining body atraumatically. The  was handed to mother for skin to skin. Delayed cord clamping was performed for 1 minute, and then was clamped and cut. Cord blood & gases collected. The placenta delivered spontaneously intact with 3v cord. Uterus massaged and firm with oxytocin given IV, patient stable. Cervix, vagina and perineum inspected. Repair of episiotomy in the usual fashion with 2-0 chromic.  live male, APGARS 9/9 delivered. EBL -400, hemostasis assured, uterus firm, patient in stable condition.

## 2022-12-15 NOTE — PROGRESS NOTE ADULT - ASSESSMENT
A/P: 21yo  at 38w3d GA, GBS pos, IOL FGR, s/p epidural, s/p cervidil, now with IUPC and ISE in place, with category 2 tracing undergoing resuscitation, in early labor progressing well.  -Continue current management   -Pain management prn  -Continuous EFM/toco  -IVF hydration, clear liquid diet  -will restart pitocin when safe to do so    Dr. Christensen at bedside

## 2022-12-15 NOTE — PROGRESS NOTE ADULT - ASSESSMENT
22y.o.  @ 38.3wks IOL for FGR, GBS positive  - Pt may shower and have light breakfast  - Will start Pitocin @ 0900  - Continuous  EFM and toco  - Pain management PRN  Dr. Christensen aware

## 2022-12-15 NOTE — PROGRESS NOTE ADULT - SUBJECTIVE AND OBJECTIVE BOX
PGY 1 Note    Patient seen at bedside for evaluation of labor progression. Pt reports doing well, resting comfortably. Just returned from trip to bathroom.     T(F): 98.6 (20:27)  HR: 60 (00:07)  BP: 105/53 (00:07)  RR: 18 (20:27)    EFM: 145/mod/+accels  TOCO: uterine irritability  SVE: deferred, cervidil in place    Labs:                        11.7   13.16 )-----------( 221      ( 14 Dec 2022 20:58 )             32.5           ABO RH Interpretation: A POS (22 @ 20:58)    Urinalysis Basic - ( 14 Dec 2022 20:58 )    Color: Yellow / Appearance: Clear / S.027 / pH: x  Gluc: x / Ketone: Trace  / Bili: Negative / Urobili: <2 mg/dL   Blood: x / Protein: 30 mg/dL / Nitrite: Negative   Leuk Esterase: Negative / RBC: 0 /HPF / WBC 5 /HPF   Sq Epi: x / Non Sq Epi: 13 /HPF / Bacteria: Many          Meds: ampicillin  IVPB 1 Gram(s) IV Intermittent every 4 hours  citric acid/sodium citrate Solution 15 milliLiter(s) Oral every 6 hours  influenza   Vaccine 0.5 milliLiter(s) IntraMuscular once  lactated ringers. 1000 milliLiter(s) IV Continuous <Continuous>  oxytocin Infusion 333.333 milliUNIT(s)/Min IV Continuous <Continuous>

## 2022-12-15 NOTE — OB RN DELIVERY SUMMARY - NSSELHIDDEN_OBGYN_ALL_OB_FT
[NS_DeliveryAttending1_OBGYN_ALL_OB_FT:[id number]] [NS_DeliveryAttending1_OBGYN_ALL_OB_FT:[id number]],[NS_DeliveryRN_OBGYN_ALL_OB_FT:[id number]=]

## 2022-12-15 NOTE — PROGRESS NOTE ADULT - ASSESSMENT
A/P: 21yo  at 38w3d GA, GBS pos, s/p cervidil, on pitocin, IOL FGR    -Continue current management   -Pain management prn  -Continuous EFM/toco  -diet: CLD  -IVF hydration     Dr. Dejesus and Dr. Christensen to be aware

## 2022-12-15 NOTE — PROGRESS NOTE ADULT - SUBJECTIVE AND OBJECTIVE BOX
PGY1 Note    Patient seen at bedside. No complaints at the moment.    Vital Signs Last 24 Hrs  T(C): 36.8 (15 Dec 2022 13:26), Max: 37 (14 Dec 2022 20:27)  T(F): 98.24 (15 Dec 2022 13:26), Max: 98.6 (14 Dec 2022 20:27)  HR: 69 (15 Dec 2022 16:38) (57 - 96)  BP: 108/61 (15 Dec 2022 16:38) (94/52 - 126/84)  RR: 18 (14 Dec 2022 20:27) (18 - 18)    EFM: 135/mod/+accels/variable decels  TOCO: q2min  SVE: deferred, last /-2 at 1505    Medications:  amp: first dose 2130  cervidil: in 2130  pit: started 12/15 0930, currently at 12 mu/min    Labs:                        11.7   13.16 )-----------( 221      ( 14 Dec 2022 20:58 )             32.5       Prenatal Syphilis Test: Nonreact ( @ 20:58)  Rapid HIV-1/2 Antibody: Nonreact ( @ 20:58)  Antibody Screen: NEG (22 @ 20:58)    Urinalysis Basic - ( 14 Dec 2022 20:58 )    Color: Yellow / Appearance: Clear / S.027 / pH: x  Gluc: x / Ketone: Trace  / Bili: Negative / Urobili: <2 mg/dL   Blood: x / Protein: 30 mg/dL / Nitrite: Negative   Leuk Esterase: Negative / RBC: 0 /HPF / WBC 5 /HPF   Sq Epi: x / Non Sq Epi: 13 /HPF / Bacteria: Many

## 2022-12-15 NOTE — PROCEDURE NOTE - ADDITIONAL PROCEDURE DETAILS
Lumbar epidural performed at L3-4. Standard ASA monitors including BP, pulse oximetry, FHR. Sterile gloves, chlorhexidine prep. 1% lidocaine for local infiltration. 17g Touhy. TISHA to saline at cm.  Epidural catheter threaded easily to cm. Touhy needle removed. Catheter secured in place. Aspiration negative for blood/CSF. Test dose consisting of 3ml 1.5% lidocaine with epinephrine was negative.  Loading does as above. Patient tolerated procedure, was hemodynamically stable throughout and did not complain of pain or paresthesias. T10 level bilaterally. Epidural infusion as per orders. Lumbar epidural performed at L3-4. Standard ASA monitors including BP, pulse oximetry, FHR. Sterile gloves, chlorhexidine prep. 1% lidocaine for local infiltration. 17g Touhy. TISHA to saline at 7cm.  Epidural catheter threaded easily to 12cm. Touhy needle removed. Catheter secured in place. Aspiration negative for blood/CSF. Test dose consisting of 3ml 1.5% lidocaine with epinephrine was negative.  Loading does as above. Patient tolerated procedure, was hemodynamically stable throughout and did not complain of pain or paresthesias. T10 level bilaterally. Epidural infusion as per orders. Lumbar epidural performed at L3-4. Standard ASA monitors including BP, pulse oximetry, FHR. Sterile gloves, chlorhexidine prep. 1% lidocaine for local infiltration. 17g Touhy. TISHA to saline at 7cm.  Epidural catheter threaded easily to 12cm. Touhy needle removed. Catheter secured in place. Aspiration negative for blood/CSF. Test dose consisting of 3ml 1.5% lidocaine with epinephrine was negative.  Loading does as above. Patient tolerated procedure, was hemodynamically stable throughout and did not complain of pain or paresthesias. T10 level bilaterally. Epidural infusion as per orders.    Pt doing well VSS, no epidural related issues at this time.

## 2022-12-15 NOTE — PROGRESS NOTE ADULT - SUBJECTIVE AND OBJECTIVE BOX
Pt evaluated at bedside, c/o mild cramping.     T(C): 37 (12-14-22 @ 20:27), Max: 37 (12-14-22 @ 20:27)  HR: 60 (12-15-22 @ 07:04) (60 - 83)  BP: 94/52 (12-15-22 @ 07:04) (94/52 - 122/74)  RR: 18 (12-14-22 @ 20:27) (18 - 18)    VE: 1/70/-2  Cervidil removed  CTx: irritability noted  FHR: 135 moderate variability, Cat I              Ampicillin 2 gms given at 2130  Ampicillin 1gm @ 0135 and 0534                    11.7   13.16 )-----------( 221      ( 14 Dec 2022 20:58 )             32.5     A positive  RPR nonreactive  HIV nonreactive

## 2022-12-15 NOTE — PROGRESS NOTE ADULT - SUBJECTIVE AND OBJECTIVE BOX
PGY1 Note    Patient seen at bedside. No complaints at the moment.    Vital Signs Last 24 Hrs  T(C): 37 (14 Dec 2022 20:27), Max: 37 (14 Dec 2022 20:27)  T(F): 98.6 (14 Dec 2022 20:27), Max: 98.6 (14 Dec 2022 20:27)  HR: 63 (15 Dec 2022 10:24) (60 - 96)  BP: 105/57 (15 Dec 2022 10:24) (94/52 - 126/84)  RR: 18 (14 Dec 2022 20:27) (18 - 18)    EFM: 140/mod/+accels/variable decels  TOCO: q2min  SVE: deferred, last /-2 at 0756    Medications:  amp: first dose 2130  cervidil: in 2130  pit: started 12/15 0930, currently at 6 mu/min    Labs:                        11.7   13.16 )-----------( 221      ( 14 Dec 2022 20:58 )             32.5       Prenatal Syphilis Test: Nonreact ( @ 20:58)  Rapid HIV-1/2 Antibody: Nonreact ( @ 20:58)  Antibody Screen: NEG (22 @ 20:58)    Urinalysis Basic - ( 14 Dec 2022 20:58 )    Color: Yellow / Appearance: Clear / S.027 / pH: x  Gluc: x / Ketone: Trace  / Bili: Negative / Urobili: <2 mg/dL   Blood: x / Protein: 30 mg/dL / Nitrite: Negative   Leuk Esterase: Negative / RBC: 0 /HPF / WBC 5 /HPF   Sq Epi: x / Non Sq Epi: 13 /HPF / Bacteria: Many

## 2022-12-15 NOTE — PROGRESS NOTE ADULT - ASSESSMENT
A/P: 23yo  at 38w3d GA, GBS pos, s/p cervidil, on pitocin, IOL FGR    -Continue current management   -Pain management prn  -Continuous EFM/toco  -diet: CLD  -IVF hydration     Dr. Dejesus and Dr. Christensen to be aware

## 2022-12-15 NOTE — OB PROVIDER DELIVERY SUMMARY - NSSELHIDDEN_OBGYN_ALL_OB_FT
[NS_DeliveryAttending1_OBGYN_ALL_OB_FT:[id number]],[NS_DeliveryRN_OBGYN_ALL_OB_FT:[id number]] [NS_DeliveryAttending1_OBGYN_ALL_OB_FT:[id number]],[NS_DeliveryRN_OBGYN_ALL_OB_FT:[id number]],[NS_DeliveryAssist1_OBGYN_ALL_OB_FT:[id number]=]

## 2022-12-15 NOTE — PROGRESS NOTE ADULT - ASSESSMENT
A/P: 21 yo  @38w2d, GBS positive, FGR 2%, AC <1%, UA dopplers wnl, here for scheduled IOL, cervidil in place  -cont EFM/toco  -clear liquid diet, IVF  -pain management prn  -cont IOL with cervidil    Dr. Daugherty and Dr. Christensen aware A/P: 21 yo  @38w3d, GBS positive, FGR 2%, AC <1%, UA dopplers wnl, here for scheduled IOL, cervidil in place  -cont EFM/toco  -clear liquid diet, IVF  -pain management prn  -cont IOL with cervidil    Dr. Daugherty and Dr. Christensen aware

## 2022-12-15 NOTE — PROGRESS NOTE ADULT - SUBJECTIVE AND OBJECTIVE BOX
PGY3 Note    Patient seen at bedside. No complaints at the moment.    T(F): 98.06 (12-15 @ 17:43), Max: 98.6 ( @ 20:27)  HR: 64 (12-15 @ 18:42)  BP: 103/59 (12-15 @ 18:40) (92/50 - 126/84)  RR: 18 (12-15 @ 17:19)  EFM: 120bpm/moderate variability/+accelerations/intermittent late decelerations  TOCO: q5mins  SVE:4/90/-2 vtx ruptured ISE, IUPC placed    Medications:  ampicillin  IVPB: 200 ( @ 21:30)  ampicillin  IVPB: 108 (12-15 @ 17:43),  108 (12-15 @ 13:30),  108 (12-15 @ 09:30),  108 (12-15 @ 05:33),  108 (12-15 @ 01:34)  dinoprostone Insert: 10 ( @ 21:30)  lactated ringers.: 125 ( @ 21:32),  125 ( @ 19:47)  oxytocin Infusion.: 2 (12-15 @ 09:30) now off      Labs:                        11.7   13.16 )-----------( 221      ( 14 Dec 2022 20:58 )             32.5           Prenatal Syphilis Test: Nonreact ( @ 20:58)  Rapid HIV-1/2 Antibody: Nonreact ( @ 20:58)  Antibody Screen: NEG (22 @ 20:58)    Urinalysis Basic - ( 14 Dec 2022 20:58 )    Color: Yellow / Appearance: Clear / S.027 / pH: x  Gluc: x / Ketone: Trace  / Bili: Negative / Urobili: <2 mg/dL   Blood: x / Protein: 30 mg/dL / Nitrite: Negative   Leuk Esterase: Negative / RBC: 0 /HPF / WBC 5 /HPF   Sq Epi: x / Non Sq Epi: 13 /HPF / Bacteria: Many

## 2022-12-16 ENCOUNTER — APPOINTMENT (OUTPATIENT)
Dept: ANTEPARTUM | Facility: CLINIC | Age: 22
End: 2022-12-16

## 2022-12-16 LAB
BASOPHILS # BLD AUTO: 0.05 K/UL — SIGNIFICANT CHANGE UP (ref 0–0.2)
BASOPHILS NFR BLD AUTO: 0.4 % — SIGNIFICANT CHANGE UP (ref 0–1)
EOSINOPHIL # BLD AUTO: 0.12 K/UL — SIGNIFICANT CHANGE UP (ref 0–0.7)
EOSINOPHIL NFR BLD AUTO: 1 % — SIGNIFICANT CHANGE UP (ref 0–8)
HCT VFR BLD CALC: 26.5 % — LOW (ref 37–47)
HGB BLD-MCNC: 9.1 G/DL — LOW (ref 12–16)
IMM GRANULOCYTES NFR BLD AUTO: 0.5 % — HIGH (ref 0.1–0.3)
LYMPHOCYTES # BLD AUTO: 1.64 K/UL — SIGNIFICANT CHANGE UP (ref 1.2–3.4)
LYMPHOCYTES # BLD AUTO: 13.4 % — LOW (ref 20.5–51.1)
MCHC RBC-ENTMCNC: 31.6 PG — HIGH (ref 27–31)
MCHC RBC-ENTMCNC: 34.3 G/DL — SIGNIFICANT CHANGE UP (ref 32–37)
MCV RBC AUTO: 92 FL — SIGNIFICANT CHANGE UP (ref 81–99)
MONOCYTES # BLD AUTO: 1.14 K/UL — HIGH (ref 0.1–0.6)
MONOCYTES NFR BLD AUTO: 9.3 % — SIGNIFICANT CHANGE UP (ref 1.7–9.3)
NEUTROPHILS # BLD AUTO: 9.23 K/UL — HIGH (ref 1.4–6.5)
NEUTROPHILS NFR BLD AUTO: 75.4 % — HIGH (ref 42.2–75.2)
NRBC # BLD: 0 /100 WBCS — SIGNIFICANT CHANGE UP (ref 0–0)
PLATELET # BLD AUTO: 175 K/UL — SIGNIFICANT CHANGE UP (ref 130–400)
RBC # BLD: 2.88 M/UL — LOW (ref 4.2–5.4)
RBC # FLD: 13.3 % — SIGNIFICANT CHANGE UP (ref 11.5–14.5)
WBC # BLD: 12.24 K/UL — HIGH (ref 4.8–10.8)
WBC # FLD AUTO: 12.24 K/UL — HIGH (ref 4.8–10.8)

## 2022-12-16 PROCEDURE — 88307 TISSUE EXAM BY PATHOLOGIST: CPT | Mod: 26

## 2022-12-16 PROCEDURE — 99232 SBSQ HOSP IP/OBS MODERATE 35: CPT

## 2022-12-16 RX ORDER — IBUPROFEN 200 MG
600 TABLET ORAL EVERY 6 HOURS
Refills: 0 | Status: DISCONTINUED | OUTPATIENT
Start: 2022-12-16 | End: 2022-12-17

## 2022-12-16 RX ORDER — ACETAMINOPHEN 500 MG
3 TABLET ORAL
Qty: 0 | Refills: 0 | DISCHARGE
Start: 2022-12-16

## 2022-12-16 RX ORDER — BENZOCAINE 10 %
1 GEL (GRAM) MUCOUS MEMBRANE
Qty: 0 | Refills: 0 | DISCHARGE
Start: 2022-12-16

## 2022-12-16 RX ORDER — IBUPROFEN 200 MG
1 TABLET ORAL
Qty: 0 | Refills: 0 | DISCHARGE
Start: 2022-12-16

## 2022-12-16 RX ORDER — AER TRAVELER 0.5 G/1
1 SOLUTION RECTAL; TOPICAL
Qty: 0 | Refills: 0 | DISCHARGE
Start: 2022-12-16

## 2022-12-16 RX ADMIN — Medication 600 MILLIGRAM(S): at 12:34

## 2022-12-16 RX ADMIN — Medication 975 MILLIGRAM(S): at 15:28

## 2022-12-16 RX ADMIN — Medication 1 TABLET(S): at 12:04

## 2022-12-16 RX ADMIN — Medication 975 MILLIGRAM(S): at 02:52

## 2022-12-16 RX ADMIN — Medication 1 SPRAY(S): at 03:46

## 2022-12-16 RX ADMIN — SODIUM CHLORIDE 3 MILLILITER(S): 9 INJECTION INTRAMUSCULAR; INTRAVENOUS; SUBCUTANEOUS at 21:23

## 2022-12-16 RX ADMIN — Medication 975 MILLIGRAM(S): at 15:58

## 2022-12-16 RX ADMIN — Medication 600 MILLIGRAM(S): at 18:25

## 2022-12-16 RX ADMIN — Medication 975 MILLIGRAM(S): at 09:14

## 2022-12-16 RX ADMIN — Medication 975 MILLIGRAM(S): at 09:44

## 2022-12-16 RX ADMIN — SODIUM CHLORIDE 3 MILLILITER(S): 9 INJECTION INTRAMUSCULAR; INTRAVENOUS; SUBCUTANEOUS at 06:26

## 2022-12-16 RX ADMIN — Medication 600 MILLIGRAM(S): at 23:10

## 2022-12-16 RX ADMIN — SODIUM CHLORIDE 3 MILLILITER(S): 9 INJECTION INTRAMUSCULAR; INTRAVENOUS; SUBCUTANEOUS at 15:31

## 2022-12-16 RX ADMIN — Medication 600 MILLIGRAM(S): at 07:01

## 2022-12-16 RX ADMIN — Medication 975 MILLIGRAM(S): at 20:45

## 2022-12-16 RX ADMIN — Medication 1 APPLICATION(S): at 03:45

## 2022-12-16 RX ADMIN — Medication 600 MILLIGRAM(S): at 12:04

## 2022-12-16 RX ADMIN — Medication 600 MILLIGRAM(S): at 17:55

## 2022-12-16 NOTE — DISCHARGE NOTE OB - PLAN OF CARE
Nothing in the vagina for 6 weeks (no sex, no tampons, no douching). Avoid tub baths, you may shower.  If you have a fever of 100.4F or greater, severe vaginal bleeding, or severe abdominal pain, call your Ob/Gyn or come to the emergency department immediately.  Please follow up with your provider in 6 weeks for postpartum visit. no

## 2022-12-16 NOTE — DISCHARGE NOTE OB - NS MD DC FALL RISK RISK
For information on Fall & Injury Prevention, visit: [email or web address] OR  [email or web address] OR  https://www.cdc.gov/steadi/patient.html

## 2022-12-16 NOTE — DISCHARGE NOTE OB - CARE PROVIDER_API CALL
Eh Christensen)  Obstetrics and Gynecology  [address] [place] [address]  Phone: [phone]  Fax: [phone]  Follow Up Time:

## 2022-12-16 NOTE — DISCHARGE NOTE OB - MEDICATION SUMMARY - MEDICATIONS TO TAKE
I will START or STAY ON the medications listed below when I get home from the hospital:    acetaminophen 325 mg oral tablet  -- 3 tab(s) by mouth every 6 hours, As Needed  -- Indication: For pain    ibuprofen 600 mg oral tablet  -- 1 tab(s) by mouth every 6 hours, As Needed  -- Indication: For pain    witch hazel 50% topical pad  -- 1 application on skin every 4 hours, As needed, Perineal discomfort  -- Indication: For comfort    benzocaine 20% topical spray  -- 1 spray(s) on skin every 6 hours, As needed, for Perineal discomfort  -- Indication: For comfort    Prenatal Multivitamins with Folic Acid 1 mg oral tablet  -- 1 tab(s) by mouth once a day  -- Indication: For healthy  mom

## 2022-12-16 NOTE — DISCHARGE NOTE OB - PATIENT PORTAL LINK FT
You can access the FollowMyHealth Patient Portal offered by [facility] by registering at the following website: [email or web address]. By joining [facility]’s FollowMyHealth portal, you will also be able to view your health information using other applications (apps) compatible with our system.

## 2022-12-16 NOTE — PROGRESS NOTE ADULT - SUBJECTIVE AND OBJECTIVE BOX
PGY 1 Post Partum note    Subjective: Patient seen and examined at bedside.  Denies any complaints.  Denies heavy vaginal bleeding. Ambulating, tolerating PO, voiding.  Pain well controlled.  Pt reports trying to breast feed.       Physical exam:    Vital Signs Last 24 Hrs  T(C): 37.3 (16 Dec 2022 00:38), Max: 37.3 (16 Dec 2022 00:38)  T(F): 99.2 (16 Dec 2022 00:38), Max: 99.2 (16 Dec 2022 00:38)  HR: 71 (16 Dec 2022 00:38) (54 - 139)  BP: 111/53 (16 Dec 2022 00:38) (92/50 - 136/72)  RR: 18 (16 Dec 2022 00:38) (18 - 18)  SpO2: 97% (15 Dec 2022 23:12) (95% - 99%)        Gen: NAD  Abdomen: nondistended, soft, nontender, firm uterine fundus at the level of the umbilicus  Pelvic: moderate lochia  Ext: No calf tenderness, no LE swelling      Meds:   MEDICATIONS  (STANDING):  acetaminophen     Tablet .. 975 milliGRAM(s) Oral <User Schedule>  diphtheria/tetanus/pertussis (acellular) Vaccine (Adacel) 0.5 milliLiter(s) IntraMuscular once  ibuprofen  Tablet. 600 milliGRAM(s) Oral every 6 hours  ketorolac   Injectable 30 milliGRAM(s) IV Push once  oxytocin Infusion 41.667 milliUNIT(s)/Min (125 mL/Hr) IV Continuous <Continuous>  prenatal multivitamin 1 Tablet(s) Oral daily  sodium chloride 0.9% lock flush 3 milliLiter(s) IV Push every 8 hours    MEDICATIONS  (PRN):  benzocaine 20%/menthol 0.5% Spray 1 Spray(s) Topical every 6 hours PRN for Perineal discomfort  dibucaine 1% Ointment 1 Application(s) Topical every 6 hours PRN Perineal discomfort  diphenhydrAMINE 25 milliGRAM(s) Oral every 6 hours PRN Pruritus  hydrocortisone 1% Cream 1 Application(s) Topical every 6 hours PRN Moderate Pain (4-6)  lanolin Ointment 1 Application(s) Topical every 6 hours PRN nipple soreness  magnesium hydroxide Suspension 30 milliLiter(s) Oral two times a day PRN Constipation  oxyCODONE    IR 5 milliGRAM(s) Oral every 3 hours PRN Moderate to Severe Pain (4-10)  oxyCODONE    IR 5 milliGRAM(s) Oral once PRN Moderate to Severe Pain (4-10)  simethicone 80 milliGRAM(s) Chew every 4 hours PRN Gas  witch hazel Pads 1 Application(s) Topical every 4 hours PRN Perineal discomfort    Diet: regular    LABS:                        11.7   13.16 )-----------( 221      ( 14 Dec 2022 20:58 )             32.5       Allergies    No Known Allergies    Intolerances         PGY 1 Post Partum note    Subjective: Patient seen and examined at bedside.  Denies any complaints.  Denies heavy vaginal bleeding. Ambulating, tolerating PO, voiding.  Pain well controlled.  Pt reports trying to breast feed.       Physical exam:    Vital Signs Last 24 Hrs  T(C): 37.3 (16 Dec 2022 00:38), Max: 37.3 (16 Dec 2022 00:38)  T(F): 99.2 (16 Dec 2022 00:38), Max: 99.2 (16 Dec 2022 00:38)  HR: 71 (16 Dec 2022 00:38) (54 - 139)  BP: 111/53 (16 Dec 2022 00:38) (92/50 - 136/72)  RR: 18 (16 Dec 2022 00:38) (18 - 18)  SpO2: 97% (15 Dec 2022 23:12) (95% - 99%)        Gen: NAD  Abdomen: nondistended, soft, nontender, firm uterine fundus at the level of the umbilicus  Pelvic: moderate lochia  Ext: No calf tenderness, no LE swelling          Diet: regular    LABS:                        11.7   13.16 )-----------( 221      ( 14 Dec 2022 20:58 )             32.5

## 2022-12-17 VITALS
HEART RATE: 57 BPM | RESPIRATION RATE: 16 BRPM | DIASTOLIC BLOOD PRESSURE: 70 MMHG | SYSTOLIC BLOOD PRESSURE: 120 MMHG | TEMPERATURE: 98 F

## 2022-12-17 RX ORDER — INFLUENZA VIRUS VACCINE 15; 15; 15; 15 UG/.5ML; UG/.5ML; UG/.5ML; UG/.5ML
0.5 SUSPENSION INTRAMUSCULAR ONCE
Refills: 0 | Status: COMPLETED | OUTPATIENT
Start: 2022-12-17 | End: 2022-12-17

## 2022-12-17 RX ADMIN — SODIUM CHLORIDE 3 MILLILITER(S): 9 INJECTION INTRAMUSCULAR; INTRAVENOUS; SUBCUTANEOUS at 05:13

## 2022-12-17 RX ADMIN — Medication 600 MILLIGRAM(S): at 12:39

## 2022-12-17 RX ADMIN — Medication 600 MILLIGRAM(S): at 18:00

## 2022-12-17 RX ADMIN — Medication 1 TABLET(S): at 12:39

## 2022-12-17 RX ADMIN — INFLUENZA VIRUS VACCINE 0.5 MILLILITER(S): 15; 15; 15; 15 SUSPENSION INTRAMUSCULAR at 18:50

## 2022-12-17 RX ADMIN — Medication 975 MILLIGRAM(S): at 18:29

## 2022-12-17 RX ADMIN — Medication 975 MILLIGRAM(S): at 08:18

## 2022-12-17 RX ADMIN — Medication 975 MILLIGRAM(S): at 14:35

## 2022-12-17 NOTE — PROGRESS NOTE ADULT - SUBJECTIVE AND OBJECTIVE BOX
PGY1 Note: Vaginal Delivery    PPD2. Pt seen and evaluated at bedside. Pain well controlled. Vaginal bleeding minimal. Denies dizziness/lightheadedness/CP/SOB/palpitations. Denies fever, chills, nausea/vomiting, diarrhea, dysuria, LE pain.     Ambulating: Yes  Voiding: Yes  Breast or bottle feeding: breast  Diet: tolerating regular diet     Physical Exam  Vital Signs Last 24 Hrs  T(C): 36.5 (16 Dec 2022 23:43), Max: 36.5 (16 Dec 2022 23:43)  T(F): 97.7 (16 Dec 2022 23:43), Max: 97.7 (16 Dec 2022 23:43)  HR: 61 (16 Dec 2022 23:43) (61 - 74)  BP: 120/72 (16 Dec 2022 23:43) (108/71 - 120/72)  RR: 18 (16 Dec 2022 23:43) (18 - 18)    Gen: AAOx3, NAD  Fundus: firm, below umbilicus   Abd: Soft, nontender, nondistended  Lochia: minimal   Ext: No calf tenderness, no swelling    Labs:                        9.1    12.24 )-----------( 175      ( 16 Dec 2022 11:57 )             26.5                         11.7   13.16 )-----------( 221      ( 14 Dec 2022 20:58 )             32.5        MEDICATIONS  (STANDING):  acetaminophen     Tablet .. 975 milliGRAM(s) Oral <User Schedule>  diphtheria/tetanus/pertussis (acellular) Vaccine (Adacel) 0.5 milliLiter(s) IntraMuscular once  ibuprofen  Tablet. 600 milliGRAM(s) Oral every 6 hours  ketorolac   Injectable 30 milliGRAM(s) IV Push once  oxytocin Infusion 41.667 milliUNIT(s)/Min (125 mL/Hr) IV Continuous <Continuous>  prenatal multivitamin 1 Tablet(s) Oral daily  sodium chloride 0.9% lock flush 3 milliLiter(s) IV Push every 8 hours    MEDICATIONS  (PRN):  benzocaine 20%/menthol 0.5% Spray 1 Spray(s) Topical every 6 hours PRN for Perineal discomfort  dibucaine 1% Ointment 1 Application(s) Topical every 6 hours PRN Perineal discomfort  diphenhydrAMINE 25 milliGRAM(s) Oral every 6 hours PRN Pruritus  hydrocortisone 1% Cream 1 Application(s) Topical every 6 hours PRN Moderate Pain (4-6)  lanolin Ointment 1 Application(s) Topical every 6 hours PRN nipple soreness  magnesium hydroxide Suspension 30 milliLiter(s) Oral two times a day PRN Constipation  oxyCODONE    IR 5 milliGRAM(s) Oral every 3 hours PRN Moderate to Severe Pain (4-10)  oxyCODONE    IR 5 milliGRAM(s) Oral once PRN Moderate to Severe Pain (4-10)  simethicone 80 milliGRAM(s) Chew every 4 hours PRN Gas  witch hazel Pads 1 Application(s) Topical every 4 hours PRN Perineal discomfort

## 2022-12-17 NOTE — PROGRESS NOTE ADULT - ASSESSMENT
A/P:  22y yo P1 s/p , PPD 2, recovering well  -encourage ambulation  -regular diet  -encourage PO hydration  -pain management prn   anticipated DC today    Dr. Burns and Dr. Modi aware.

## 2022-12-19 ENCOUNTER — APPOINTMENT (OUTPATIENT)
Dept: ANTEPARTUM | Facility: CLINIC | Age: 22
End: 2022-12-19

## 2022-12-20 LAB — SURGICAL PATHOLOGY STUDY: SIGNIFICANT CHANGE UP

## 2022-12-22 ENCOUNTER — APPOINTMENT (OUTPATIENT)
Dept: ANTEPARTUM | Facility: CLINIC | Age: 22
End: 2022-12-22

## 2022-12-23 DIAGNOSIS — Z3A.38 38 WEEKS GESTATION OF PREGNANCY: ICD-10-CM

## 2022-12-23 DIAGNOSIS — O36.5930 MATERNAL CARE FOR OTHER KNOWN OR SUSPECTED POOR FETAL GROWTH, THIRD TRIMESTER, NOT APPLICABLE OR UNSPECIFIED: ICD-10-CM

## 2023-01-17 PROBLEM — Z78.9 OTHER SPECIFIED HEALTH STATUS: Chronic | Status: ACTIVE | Noted: 2022-12-14

## 2023-01-23 NOTE — DISCUSSION/SUMMARY
[FreeTextEntry1] : 22\par MFM Att'g f/u Consult Note: \par Ms Gaffney is a patient of Dr Eh Christensen who has been referred for management of fetal growth restriction and returns today with her  (PGY-1 radiology resident).  \par She is a [id number] at 35w6d (MANNY 22 by LMP c/w early outside [place]).  Please see our initial US consultation from 11/10. Graeme presented at 33w with EFW at 1st %ile by LMP.  [facility], and the fetal heart was not completely imaged. \par \par Today's f/u US shows normal interval growth with EFW 2215g at 5th%ile with HC at 3rd%ile and AC at the 10th%ile. These measurements suggest some improvement from improved from  measurements with EFW at 1st%ile and AC at 3rd%ile. \par \par Fetal testing is very reassuring today:\par BPP 10/10.  NST baseline 150 with moderate variability, accels to 170, no decels.  TOCO no contractions. REACTIVE.\par Umbilical Artery Dopplers are improved: s/d =  2.5, improved from last week's 3.2. \par \par We discussed delivery options.  I explained that with today's findings, elective delivery between 37 and 39w0d is very reasonable and depends on multiple factors including:\par - Discussion with primary Obstetrician, Dr Christensen.\par - Continued reassuring findings on fetal testing. \par - Their own comfort level proceeding past early term intervention.  \par \par For now they plan to return for twice weekly testing later this week.  \par Continue twice daily fetal movement counts. \par Come to L&D for VB, LOF, decr fetal movements or abdominal pain/contractions.  They understand that false alarms are fine.\par \par MD Joel, FACOG\par \par \par

## 2023-01-31 ENCOUNTER — APPOINTMENT (OUTPATIENT)
Dept: OBGYN | Facility: CLINIC | Age: 23
End: 2023-01-31
Payer: COMMERCIAL

## 2023-01-31 PROCEDURE — 0503F POSTPARTUM CARE VISIT: CPT

## 2023-01-31 NOTE — HISTORY OF PRESENT ILLNESS
[FreeTextEntry1] : ----   21 Y/O  HERE FOR PP EXAM;;BOY;NURSING; 12/15/22\par PMHX;/\par PSHX;/\par SOCIAL HX;-ETOH -CIGG \par STD;   /        STD PREVENTION DISCUSSED\par FAMILY HISTORY OF BREAST CANCER;NO\par REVIEW OF SYMPTOMS DONE;\par ALLERGIES; pt answered NKDA\par Medication reconciliation was completed by reviewing, with the patient's\par involvement, the patient's current outpatient medications and those \par ordered for the patient today. \par         \par PE;\par ABDOMEN;SOFT NT ND\par NL GENITALIA\par VAGINA -DC MIN BLOOD\par CERVIX;-CMT\par UTERUS;NL SIZE NT AV\par ADNEXA; NT - MASSES\par \par A;P;PP EXAM\par -PAP GC CHLAMYDIA\par -CONTRACEPTION DISCUSSED;PT CONSIDERING IUD RBA DISCUSSED\par -F-U PRN.\par \par \par \par

## 2023-03-03 NOTE — OB RN PATIENT PROFILE - NS PRO ABUSE SCREEN AFRAID ANYONE YN
[FreeTextEntry1] : Annual exam\par Had flu shot\par has covi d shots and covid x 2\par feel well\par bowel issue discussed\par gas x, pecid discussed\par  no

## 2023-05-18 ENCOUNTER — APPOINTMENT (OUTPATIENT)
Dept: OBGYN | Facility: CLINIC | Age: 23
End: 2023-05-18
Payer: COMMERCIAL

## 2023-05-18 DIAGNOSIS — N91.2 AMENORRHEA, UNSPECIFIED: ICD-10-CM

## 2023-05-18 PROCEDURE — 76830 TRANSVAGINAL US NON-OB: CPT

## 2023-05-18 PROCEDURE — 99213 OFFICE O/P EST LOW 20 MIN: CPT

## 2023-05-18 NOTE — HISTORY OF PRESENT ILLNESS
[FreeTextEntry1] : ----   21 Y/O  LMP 23 EDC 24 EGA 6 WEEKS;\par PMHX;/\par PSHX;/\par SOCIAL HX;-ETOH -CIGG \par STD;   /        STD PREVENTION DISCUSSED\par FAMILY HISTORY OF BREAST CANCER;NO\par REVIEW OF SYMPTOMS DONE;\par ALLERGIES; pt answered NKDA\par Medication reconciliation was completed by reviewing, with the patient's\par involvement, the patient's current outpatient medications and those \par ordered for the patient today. \par         \par PE;\par ABDOMEN;SOFT NT ND\par EXT -CCE\par \par A;P;AMENORRHEA\par -SONO REVIEWED\par -LABS TODAY\par -ER INSTRUCTIONS REVIEWED\par -RTC 2 WEEKS.\par \par \par \par

## 2023-05-19 LAB
ABO + RH PNL BLD: NORMAL
HCG SERPL-MCNC: 8459 MIU/ML
HGB A MFR BLD: 97.3 %
HGB A2 MFR BLD: 2.7 %
HGB FRACT BLD-IMP: NORMAL
HIV1+2 AB SPEC QL IA.RAPID: NONREACTIVE
PROGEST SERPL-MCNC: 12.7 NG/ML
T PALLIDUM AB SER QL IA: NEGATIVE

## 2023-05-22 LAB
HBV SURFACE AG SER QL: NONREACTIVE
MEV IGG FLD QL IA: 40.6 AU/ML
MEV IGG+IGM SER-IMP: POSITIVE
RUBV IGG FLD-ACNC: 2.9 INDEX
RUBV IGG SER-IMP: POSITIVE
VZV AB TITR SER: NEGATIVE
VZV IGG SER IF-ACNC: 74.5 INDEX

## 2023-05-23 ENCOUNTER — NON-APPOINTMENT (OUTPATIENT)
Age: 23
End: 2023-05-23

## 2023-06-01 ENCOUNTER — APPOINTMENT (OUTPATIENT)
Dept: OBGYN | Facility: CLINIC | Age: 23
End: 2023-06-01
Payer: COMMERCIAL

## 2023-06-01 PROCEDURE — 76830 TRANSVAGINAL US NON-OB: CPT

## 2023-06-15 ENCOUNTER — APPOINTMENT (OUTPATIENT)
Dept: OBGYN | Facility: CLINIC | Age: 23
End: 2023-06-15
Payer: COMMERCIAL

## 2023-06-15 PROCEDURE — 76830 TRANSVAGINAL US NON-OB: CPT

## 2023-06-15 PROCEDURE — 99213 OFFICE O/P EST LOW 20 MIN: CPT

## 2023-06-15 NOTE — HISTORY OF PRESENT ILLNESS
[FreeTextEntry1] : ----   23 Y/O  LMP 23 EDC 24 EGA 9 WEEKS;\par PMHX;/\par PSHX;/\par SOCIAL HX;-ETOH -CIGG \par STD;   /        STD PREVENTION DISCUSSED\par FAMILY HISTORY OF BREAST CANCER;NO\par REVIEW OF SYMPTOMS DONE;\par ALLERGIES; pt answered NKDA\par Medication reconciliation was completed by reviewing, with the patient's\par involvement, the patient's current outpatient medications and those \par ordered for the patient today. \par         \par PE;\par ABDOMEN;SOFT NT ND\par EXT -CCE\par \par A;P;AMENORRHEA\par -SONO REVIEWED\par -PT TRANSFERRING TO DOCTOR IN [place]\par -ANSWERED QUESTIONS.\par \par \par \par

## 2023-08-31 NOTE — OB RN DELIVERY SUMMARY - BABY A: APGAR 1 MIN REFLEX IRRITABILITY, DELIVERY
(2) cough or sneeze Electrodesiccation Text: The wound bed was treated with electrodesiccation after the biopsy was performed.

## 2024-12-30 NOTE — PRE-ANESTHESIA EVALUATION ADULT - NSATTENDATTESTRD_GEN_ALL_CORE
Products Recommended: La Roche-Posay and Elta MD General Sunscreen Counseling: I recommended a broad spectrum sunscreen with a SPF of 30 or higher.  I explained that SPF 30 sunscreens block approximately 97 percent of the sun's harmful rays.  Sunscreens should be applied at least 15 minutes prior to expected sun exposure and then every 2 hours after that as long as sun exposure continues. If swimming or exercising sunscreen should be reapplied every 45 minutes to an hour after getting wet or sweating.  One ounce, or the equivalent of a shot glass full of sunscreen, is adequate to protect the skin not covered by a bathing suit. I also recommended a lip balm with a sunscreen as well. Sun protective clothing can be used in lieu of sunscreen but must be worn the entire time you are exposed to the sun's rays. Detail Level: Zone The patient has been re-examined and I agree with the above assessment or I updated with my findings.